# Patient Record
Sex: FEMALE | Race: OTHER | HISPANIC OR LATINO | URBAN - METROPOLITAN AREA
[De-identification: names, ages, dates, MRNs, and addresses within clinical notes are randomized per-mention and may not be internally consistent; named-entity substitution may affect disease eponyms.]

---

## 2020-01-01 ENCOUNTER — EMERGENCY (EMERGENCY)
Facility: HOSPITAL | Age: 85
LOS: 1 days | End: 2020-01-01
Attending: EMERGENCY MEDICINE | Admitting: EMERGENCY MEDICINE
Payer: MEDICARE

## 2020-01-01 VITALS — WEIGHT: 89.95 LBS

## 2020-01-01 PROCEDURE — 99285 EMERGENCY DEPT VISIT HI MDM: CPT | Mod: 25

## 2020-01-01 PROCEDURE — 92950 HEART/LUNG RESUSCITATION CPR: CPT

## 2020-01-24 NOTE — ED PROVIDER NOTE - PROGRESS NOTE DETAILS
Brief return of spontaneous circulation at one point for approximately 20 seconds with idioventricular rhythm which quickly decompensated into asystole. Brief episode of course V-fib which was defibrillated at 120 J with then decompensated. Left pretibial IO. Despite resuscitation efforts, Pt was pronounced dead at 21-56 by me (Dr. Rodriguez). Family at bedside.

## 2020-01-24 NOTE — ED PROVIDER NOTE - UNABLE TO OBTAIN
Urgent need for Intervention Per EMS, family states she was in her normal state of health all day without any reported medical complaints.

## 2020-01-24 NOTE — ED PROVIDER NOTE - ENMT, MLM
No signs of external head trauma. Intubated ET tube, confirmed placement in trachea with direct visualization.

## 2020-01-24 NOTE — ED ADULT NURSE NOTE - OBJECTIVE STATEMENT
Notificateion prior to arrival for 85 y.o F intubated in field for respirator distress with ROSC. Pt arrives to ER pulseless, CPR and ACLS protocol initiated. Provider at bedside on arrival.

## 2020-01-24 NOTE — ED PROVIDER NOTE - PHYSICAL EXAMINATION
prog- brief return of spontatnious circulation at one point for approx 20 sec with idioventriuclar rhythm which qucikly decompensated into asystoly.     brief episode of course V-fib which was difibrillated at 120 J with then decompensated.    left pretibial IO. DEspite resus erfforts, pt was pronounced dead at 21-56 by me. Family at bedside.

## 2020-01-24 NOTE — ED PROVIDER NOTE - OBJECTIVE STATEMENT
86 y/o F with PMHx of HTN and hypothyroidism is BIB EMS for in-progress cardiac arrest. As per EMS, Pt was eating pasta with family when she  suddenly collapsed. EMS was called who started CPR in progress. As per EMS, Pt was intubated, given Epinephrine and Bicarbonate. CPR was continued on arrival to the ED.

## 2020-02-01 DIAGNOSIS — I46.9 CARDIAC ARREST, CAUSE UNSPECIFIED: ICD-10-CM
